# Patient Record
Sex: FEMALE | Race: WHITE | NOT HISPANIC OR LATINO | URBAN - METROPOLITAN AREA
[De-identification: names, ages, dates, MRNs, and addresses within clinical notes are randomized per-mention and may not be internally consistent; named-entity substitution may affect disease eponyms.]

---

## 2021-07-21 ENCOUNTER — OFFICE VISIT (OUTPATIENT)
Dept: URBAN - METROPOLITAN AREA CLINIC 13 | Facility: CLINIC | Age: 56
End: 2021-07-21

## 2021-07-21 PROBLEM — 38341003 HYPERTENSION: Status: ACTIVE | Noted: 2021-07-21

## 2021-07-21 PROBLEM — 35489007 DEPRESSION: Status: ACTIVE | Noted: 2021-07-21

## 2021-07-21 PROBLEM — 398050005 DIVERTICULAR DISEASE OF COLON: Status: ACTIVE | Noted: 2021-07-21

## 2021-08-10 ENCOUNTER — OFFICE VISIT (OUTPATIENT)
Dept: URBAN - METROPOLITAN AREA CLINIC 46 | Facility: CLINIC | Age: 56
End: 2021-08-10

## 2021-08-10 PROBLEM — 3723001 ARTHRITIS: Status: ACTIVE | Noted: 2021-08-10

## 2021-08-10 PROBLEM — 271737000 ANEMIA: Status: ACTIVE | Noted: 2021-08-10

## 2021-08-10 PROBLEM — 421493004 HEART MURMUR: Status: ACTIVE | Noted: 2021-08-10

## 2021-08-10 PROBLEM — 609558009 ESSENTIAL TREMOR: Status: ACTIVE | Noted: 2021-08-10

## 2021-08-28 ENCOUNTER — TELEPHONE ENCOUNTER (OUTPATIENT)
Dept: URBAN - METROPOLITAN AREA CLINIC 13 | Facility: CLINIC | Age: 56
End: 2021-08-28

## 2021-08-29 ENCOUNTER — TELEPHONE ENCOUNTER (OUTPATIENT)
Dept: URBAN - METROPOLITAN AREA CLINIC 13 | Facility: CLINIC | Age: 56
End: 2021-08-29

## 2021-08-29 RX ORDER — HYOSCYAMINE SULFATE 0.12 MG/1
TABLET SUBLINGUAL
Status: ACTIVE | COMMUNITY
Start: 2021-08-10

## 2021-08-29 RX ORDER — OMEPRAZOLE 40 MG/1
CAPSULE, DELAYED RELEASE ORAL
Status: ACTIVE | COMMUNITY
Start: 2021-08-10

## 2021-08-29 RX ORDER — ATENOLOL 50 MG/1
TABLET ORAL
Status: ACTIVE | COMMUNITY

## 2021-08-29 RX ORDER — LINACLOTIDE 72 UG/1
CAPSULE, GELATIN COATED ORAL
Status: ACTIVE | COMMUNITY
Start: 2021-08-10

## 2021-09-16 ENCOUNTER — CLAIMS CREATED FROM THE CLAIM WINDOW (OUTPATIENT)
Dept: URBAN - METROPOLITAN AREA SURGERY CENTER 27 | Facility: SURGERY CENTER | Age: 56
End: 2021-09-16
Payer: COMMERCIAL

## 2021-09-16 DIAGNOSIS — D12.0 ADENOMA OF CECUM: ICD-10-CM

## 2021-09-16 DIAGNOSIS — D12.5 ADENOMA OF SIGMOID COLON: ICD-10-CM

## 2021-09-16 DIAGNOSIS — D12.4 ADENOMA OF DESCENDING COLON: ICD-10-CM

## 2021-09-16 DIAGNOSIS — K29.50 ANTRAL GASTRITIS: ICD-10-CM

## 2021-09-16 DIAGNOSIS — D12.2 ADENOMA OF ASCENDING COLON: ICD-10-CM

## 2021-09-16 DIAGNOSIS — R10.13 ABDOMINAL DISCOMFORT, EPIGASTRIC: ICD-10-CM

## 2021-09-16 DIAGNOSIS — R10.84 ABDOMINAL CRAMPING, GENERALIZED: ICD-10-CM

## 2021-09-16 PROCEDURE — 43239 EGD BIOPSY SINGLE/MULTIPLE: CPT | Performed by: INTERNAL MEDICINE

## 2021-09-16 PROCEDURE — 45385 COLONOSCOPY W/LESION REMOVAL: CPT | Performed by: INTERNAL MEDICINE

## 2021-09-16 PROCEDURE — G8907 PT DOC NO EVENTS ON DISCHARG: HCPCS | Performed by: INTERNAL MEDICINE

## 2021-09-16 RX ORDER — LINACLOTIDE 72 UG/1
CAPSULE, GELATIN COATED ORAL
Status: ACTIVE | COMMUNITY
Start: 2021-08-10

## 2021-09-16 RX ORDER — ATENOLOL 50 MG/1
TABLET ORAL
Status: ACTIVE | COMMUNITY

## 2021-09-16 RX ORDER — HYOSCYAMINE SULFATE 0.12 MG/1
TABLET SUBLINGUAL
Status: ACTIVE | COMMUNITY
Start: 2021-08-10

## 2021-09-16 RX ORDER — OMEPRAZOLE 40 MG/1
CAPSULE, DELAYED RELEASE ORAL
Status: ACTIVE | COMMUNITY
Start: 2021-08-10

## 2021-09-22 ENCOUNTER — TELEPHONE ENCOUNTER (OUTPATIENT)
Dept: URBAN - METROPOLITAN AREA CLINIC 46 | Facility: CLINIC | Age: 56
End: 2021-09-22

## 2021-09-23 ENCOUNTER — WEB ENCOUNTER (OUTPATIENT)
Dept: URBAN - METROPOLITAN AREA CLINIC 44 | Facility: CLINIC | Age: 56
End: 2021-09-23

## 2021-10-04 ENCOUNTER — WEB ENCOUNTER (OUTPATIENT)
Dept: URBAN - METROPOLITAN AREA CLINIC 44 | Facility: CLINIC | Age: 56
End: 2021-10-04

## 2021-10-26 ENCOUNTER — OFFICE VISIT (OUTPATIENT)
Dept: URBAN - METROPOLITAN AREA CLINIC 46 | Facility: CLINIC | Age: 56
End: 2021-10-26
Payer: COMMERCIAL

## 2021-10-26 ENCOUNTER — LAB OUTSIDE AN ENCOUNTER (OUTPATIENT)
Dept: URBAN - METROPOLITAN AREA CLINIC 46 | Facility: CLINIC | Age: 56
End: 2021-10-26

## 2021-10-26 DIAGNOSIS — R19.4 CHANGE IN BOWEL HABITS: ICD-10-CM

## 2021-10-26 DIAGNOSIS — R10.31 PAIN, RLQ: ICD-10-CM

## 2021-10-26 DIAGNOSIS — R68.81 EARLY SATIETY: ICD-10-CM

## 2021-10-26 PROCEDURE — 99213 OFFICE O/P EST LOW 20 MIN: CPT | Performed by: INTERNAL MEDICINE

## 2021-10-26 RX ORDER — OMEPRAZOLE 40 MG/1
CAPSULE, DELAYED RELEASE ORAL
Status: ON HOLD | COMMUNITY
Start: 2021-08-10

## 2021-10-26 RX ORDER — HYOSCYAMINE SULFATE 0.12 MG/1
TABLET SUBLINGUAL
Status: ACTIVE | COMMUNITY
Start: 2021-08-10

## 2021-10-26 RX ORDER — CHOLECALCIFEROL TAB 50 MCG (2000 UNIT) 50 MCG
ONCE PER WEEK TAB ORAL
Status: ACTIVE | COMMUNITY

## 2021-10-26 RX ORDER — ATENOLOL 50 MG/1
TABLET ORAL
Status: ACTIVE | COMMUNITY

## 2021-10-26 RX ORDER — TRAMADOL HYDROCHLORIDE 50 MG/1
1 TABLET AT BEDTIME TABLET, FILM COATED ORAL ONCE A DAY
Status: ACTIVE | COMMUNITY

## 2021-10-26 RX ORDER — LINACLOTIDE 72 UG/1
CAPSULE, GELATIN COATED ORAL
Status: ON HOLD | COMMUNITY
Start: 2021-08-10

## 2021-10-26 NOTE — HPI-TODAY'S VISIT:
The patient has RLQ pain that was a constant ache for 2 months. PCP started her on Tramadol due to the pain which helps her sleep some but she wakes up from pain. She takes Tylenol occasionally. Three weeks ago she began having numbness in the right groin. Hx of smoking 4 cigarettes for 2o years. PCP ordered a CT that showed diverticulosis but she was treated with 10 days of Cipro. Symptoms did not resolve and an US was done and was negative for etiology. CMP was normal 1 month ago and she was negative for H. Pylori. Bowel movements alternate between not having a bowel movement for 2-3 days followed by urgent post-prandial loose stool. Denies family hx of GI disease. Recently, she has had early satiety. The patient has rare GERD but has nausea every morning. PCP gave her Zofran but she has not taken. EGD on 9/21 was unremarkable. Colonsocopy revealed TVA and TA but no etiology for her pain.  Today, she mentions continued RLQ pain in the groin. She is tender to deep palpation at the level of the inguinal canal. The pain is a 6-7 out of 10 and occurs throughout the day. Laying on the R side alleviates some pain. Daily walking can exacerbate.  Mentions constipation and [place] 2 stools that are not improved with OTC laxatives Miralax and dulcolax. Denies hematochezia.

## 2021-11-01 ENCOUNTER — WEB ENCOUNTER (OUTPATIENT)
Dept: URBAN - METROPOLITAN AREA CLINIC 46 | Facility: CLINIC | Age: 56
End: 2021-11-01

## 2021-11-01 RX ORDER — TRAMADOL HYDROCHLORIDE 50 MG/1
1 TABLET AT BEDTIME TABLET, FILM COATED ORAL ONCE A DAY
Qty: 14 TABLET | Refills: 0

## 2021-11-17 ENCOUNTER — WEB ENCOUNTER (OUTPATIENT)
Dept: URBAN - METROPOLITAN AREA CLINIC 46 | Facility: CLINIC | Age: 56
End: 2021-11-17

## 2021-11-18 ENCOUNTER — OFFICE VISIT (OUTPATIENT)
Dept: URBAN - METROPOLITAN AREA CLINIC 46 | Facility: CLINIC | Age: 56
End: 2021-11-18

## 2021-11-29 ENCOUNTER — OFFICE VISIT (OUTPATIENT)
Dept: URBAN - METROPOLITAN AREA CLINIC 44 | Facility: CLINIC | Age: 56
End: 2021-11-29

## 2021-12-16 ENCOUNTER — OFFICE VISIT (OUTPATIENT)
Dept: URBAN - METROPOLITAN AREA CLINIC 46 | Facility: CLINIC | Age: 56
End: 2021-12-16

## 2022-06-13 ENCOUNTER — OFFICE VISIT (OUTPATIENT)
Dept: URBAN - METROPOLITAN AREA CLINIC 48 | Facility: CLINIC | Age: 57
End: 2022-06-13
Payer: COMMERCIAL

## 2022-06-13 ENCOUNTER — LAB OUTSIDE AN ENCOUNTER (OUTPATIENT)
Dept: URBAN - METROPOLITAN AREA CLINIC 44 | Facility: CLINIC | Age: 57
End: 2022-06-13

## 2022-06-13 DIAGNOSIS — R73.09: ICD-10-CM

## 2022-06-13 DIAGNOSIS — K59.01 CONSTIPATION BY DELAYED COLONIC TRANSIT: ICD-10-CM

## 2022-06-13 DIAGNOSIS — R10.31 PAIN, RLQ: ICD-10-CM

## 2022-06-13 PROBLEM — 35298007: Status: ACTIVE | Noted: 2022-06-13

## 2022-06-13 PROCEDURE — 99214 OFFICE O/P EST MOD 30 MIN: CPT | Performed by: INTERNAL MEDICINE

## 2022-06-13 RX ORDER — LINACLOTIDE 72 UG/1
1 CAPSULE AT LEAST 30 MINUTES BEFORE THE FIRST MEAL OF THE DAY ON AN EMPTY STOMACH CAPSULE, GELATIN COATED ORAL ONCE A DAY
Qty: 90 | Refills: 3 | OUTPATIENT

## 2022-06-13 RX ORDER — NORTRIPTYLINE HYDROCHLORIDE 10 MG/1
1 CAPSULE CAPSULE ORAL ONCE A DAY
Qty: 30 | Refills: 1 | OUTPATIENT

## 2022-06-13 RX ORDER — CHOLECALCIFEROL TAB 50 MCG (2000 UNIT) 50 MCG
1 CAPSULE TAB ORAL
Status: ACTIVE | COMMUNITY

## 2022-06-13 RX ORDER — ATENOLOL 50 MG/1
1 TABLET TABLET ORAL ONCE A DAY
Status: ACTIVE | COMMUNITY

## 2022-06-13 NOTE — HPI-TODAY'S VISIT:
The patient has RLQ pain that was a constant ache for 1 year. She mentions RLQ pain near her inginal canal.  She has been on analgesics which helps minimally.  Associates numbness in the right groin. Hx of smoking 4 cigarettes for 20years. PCP ordered a CT that showed diverticulosis but she was treated with 10 days of Cipro. Symptoms did not resolve and an US was done and was negative for etiology. CMP was normal  recently and she was negative for H. Pylori. Bowel movements alternate between not having a bowel movement for 2-3 days followed by urgent post-prandial loose stool. Denies family hx of GI disease. Recently, she has had early satiety. The patient has rare GERD but has nausea every morning. PCP gave her Zofran but she has not taken. EGD on 9/21 was unremarkable. Colonsocopy revealed TVA and TA but no etiology for her pain.  I ordered an MRI of the abdomen and pelvis on 10/21 that revealed a possible pelvic congestion syndrome. She was then refered by to OB/gyn who recommended a hysterectomy which was completed on 12/21.   Today, she mentions continued RLQ pain in the groin. She is tender to deep palpation at the level of the inguinal canal. The pain is a 6-7 out of 10 and occurs throughout the day. Laying on the R side alleviates some pain. Daily walking can exacerbate.  Mentions constipation and [place] 2 stools that are not improved with OTC laxatives Miralax and dulcolax. Denies hematochezia.

## 2022-06-16 LAB — HEMOGLOBIN A1C: 5.5

## 2022-07-11 ENCOUNTER — TELEPHONE ENCOUNTER (OUTPATIENT)
Dept: URBAN - METROPOLITAN AREA CLINIC 44 | Facility: CLINIC | Age: 57
End: 2022-07-11

## 2022-07-11 ENCOUNTER — OFFICE VISIT (OUTPATIENT)
Dept: URBAN - METROPOLITAN AREA CLINIC 48 | Facility: CLINIC | Age: 57
End: 2022-07-11
Payer: COMMERCIAL

## 2022-07-11 ENCOUNTER — DASHBOARD ENCOUNTERS (OUTPATIENT)
Age: 57
End: 2022-07-11

## 2022-07-11 VITALS
SYSTOLIC BLOOD PRESSURE: 134 MMHG | HEIGHT: 63 IN | BODY MASS INDEX: 33.66 KG/M2 | HEART RATE: 63 BPM | WEIGHT: 190 LBS | DIASTOLIC BLOOD PRESSURE: 78 MMHG | TEMPERATURE: 97.9 F

## 2022-07-11 DIAGNOSIS — R19.4 CHANGE IN BOWEL HABITS: ICD-10-CM

## 2022-07-11 DIAGNOSIS — R10.31 PAIN, RLQ: ICD-10-CM

## 2022-07-11 PROCEDURE — 99214 OFFICE O/P EST MOD 30 MIN: CPT | Performed by: INTERNAL MEDICINE

## 2022-07-11 RX ORDER — CHOLECALCIFEROL TAB 50 MCG (2000 UNIT) 50 MCG
1 CAPSULE TAB ORAL
Status: ACTIVE | COMMUNITY

## 2022-07-11 RX ORDER — TENAPANOR HYDROCHLORIDE 53.2 MG/1
1 TABLET IMMEDIATELY BEFORE MEALS TABLET ORAL TWICE A DAY
Qty: 60 | Refills: 11 | OUTPATIENT
Start: 2022-07-13 | End: 2023-07-08

## 2022-07-11 RX ORDER — ATENOLOL 50 MG/1
1 TABLET TABLET ORAL ONCE A DAY
Status: ACTIVE | COMMUNITY

## 2022-07-11 NOTE — HPI-TODAY'S VISIT:
The patient has R lower groin pain that was a constant ache for 1 year. She mentions sharp, achy pain that is located along the inguinal canal.  She has been on analgesics which helps minimally.  Associates numbness in the right groin. Hx of smoking 4 cigarettes for 20years. PCP ordered a CT that showed diverticulosis, but she was treated with 10 days of Cipro. Symptoms did not resolve and an US was done and was negative for etiology. CMP was normal, and she was negative for H. Pylori. Bowel movements alternate between not having a bowel movement for 2-3 days followed by urgent post-prandial loose stool. Denies family hx of GI disease. Recently, she has had early satiety. The patient has rare GERD but has nausea every morning. PCP gave her Zofran but she has not taken. EGD on 9/21 was unremarkable and  colonoscopy revealed TVA and TA but no etiology for her pain.  I ordered an MRI of the abdomen and pelvis on 10/21 that revealed a possible pelvic congestion syndrome. She was then referred by to OB/gyn who recommended a hysterectomy which was completed on 12/21. She also visited vascular surgery who did not think her pain was related to pelvic congestion and was referred back to OB/gyn. Eventually, she was referred back to GI for a second assessment.   The patient was noted to be limping in the room on initial evaluation. When I asked what happened to her leg, she mentions that it was her chronic R sided pain.   She is tender to deep palpation at the level of the inguinal canal. The pain is a 6-7 out of 10 and occurs throughout the day. Laying on the R side alleviates some pain. Daily walking can exacerbate.  Mentions constipation and [place] 2 stools that are not improved with OTC laxatives Miralax and dulcolax. Denies hematochezia. Trials of Linzess 145 mcg caused watery diarrhea and was discontinued.  I attempted to start the patient on a neuromodulator; however, she has allergies to Nortriptyline and Amitriptyline.  We agreed to take IB guard for the interim and this gave minimal improvements.

## 2023-09-09 NOTE — PHYSICAL EXAM NEUROLOGIC:
Please take prescribed medications as needed for pain. You can apply heat or ice as needed for additional comfort. Please follow-up with your regular doctor within the next 7 to 10 days. Sometimes neck pain can take quite a while to resolve, and you may need referral to physical therapy or other evaluation if symptoms or not improving. oriented to person, place and time , normal sensation , short and long term memory intact